# Patient Record
Sex: MALE | Race: BLACK OR AFRICAN AMERICAN | Employment: FULL TIME | URBAN - METROPOLITAN AREA
[De-identification: names, ages, dates, MRNs, and addresses within clinical notes are randomized per-mention and may not be internally consistent; named-entity substitution may affect disease eponyms.]

---

## 2021-03-13 ENCOUNTER — HOSPITAL ENCOUNTER (EMERGENCY)
Age: 56
Discharge: HOME | End: 2021-03-13
Attending: EMERGENCY MEDICINE
Payer: COMMERCIAL

## 2021-03-13 ENCOUNTER — APPOINTMENT (OUTPATIENT)
Dept: CT IMAGING | Age: 56
End: 2021-03-13
Payer: COMMERCIAL

## 2021-03-13 ENCOUNTER — APPOINTMENT (OUTPATIENT)
Dept: GENERAL RADIOLOGY | Age: 56
End: 2021-03-13
Payer: COMMERCIAL

## 2021-03-13 VITALS
WEIGHT: 210 LBS | BODY MASS INDEX: 28.44 KG/M2 | DIASTOLIC BLOOD PRESSURE: 95 MMHG | OXYGEN SATURATION: 100 % | TEMPERATURE: 98.5 F | HEIGHT: 72 IN | HEART RATE: 105 BPM | RESPIRATION RATE: 16 BRPM | SYSTOLIC BLOOD PRESSURE: 172 MMHG

## 2021-03-13 DIAGNOSIS — R79.89 ELEVATED SERUM CREATININE: ICD-10-CM

## 2021-03-13 DIAGNOSIS — I10 ESSENTIAL HYPERTENSION: Primary | ICD-10-CM

## 2021-03-13 DIAGNOSIS — R09.81 NASAL CONGESTION: ICD-10-CM

## 2021-03-13 DIAGNOSIS — E04.9 THYROID GOITER: ICD-10-CM

## 2021-03-13 DIAGNOSIS — R00.0 TACHYCARDIA: ICD-10-CM

## 2021-03-13 LAB
ALBUMIN SERPL-MCNC: 4.6 G/DL (ref 3.5–5.2)
ALP BLD-CCNC: 135 U/L (ref 40–129)
ALT SERPL-CCNC: 36 U/L (ref 0–40)
ANION GAP SERPL CALCULATED.3IONS-SCNC: 11 MMOL/L (ref 7–16)
AST SERPL-CCNC: 35 U/L (ref 0–39)
BASOPHILS ABSOLUTE: 0.07 E9/L (ref 0–0.2)
BASOPHILS RELATIVE PERCENT: 0.6 % (ref 0–2)
BILIRUB SERPL-MCNC: 0.4 MG/DL (ref 0–1.2)
BUN BLDV-MCNC: 14 MG/DL (ref 6–20)
CALCIUM SERPL-MCNC: 9.6 MG/DL (ref 8.6–10.2)
CHLORIDE BLD-SCNC: 96 MMOL/L (ref 98–107)
CO2: 26 MMOL/L (ref 22–29)
CREAT SERPL-MCNC: 1.5 MG/DL (ref 0.7–1.2)
D DIMER: 614 NG/ML DDU
EKG ATRIAL RATE: 130 BPM
EKG P AXIS: 71 DEGREES
EKG P-R INTERVAL: 144 MS
EKG Q-T INTERVAL: 294 MS
EKG QRS DURATION: 82 MS
EKG QTC CALCULATION (BAZETT): 432 MS
EKG R AXIS: 68 DEGREES
EKG T AXIS: 12 DEGREES
EKG VENTRICULAR RATE: 130 BPM
EOSINOPHILS ABSOLUTE: 0.4 E9/L (ref 0.05–0.5)
EOSINOPHILS RELATIVE PERCENT: 3.6 % (ref 0–6)
GFR AFRICAN AMERICAN: 59
GFR NON-AFRICAN AMERICAN: 59 ML/MIN/1.73
GLUCOSE BLD-MCNC: 98 MG/DL (ref 74–99)
HCT VFR BLD CALC: 46.5 % (ref 37–54)
HEMOGLOBIN: 15.6 G/DL (ref 12.5–16.5)
IMMATURE GRANULOCYTES #: 0.04 E9/L
IMMATURE GRANULOCYTES %: 0.4 % (ref 0–5)
LYMPHOCYTES ABSOLUTE: 1.05 E9/L (ref 1.5–4)
LYMPHOCYTES RELATIVE PERCENT: 9.4 % (ref 20–42)
MCH RBC QN AUTO: 29 PG (ref 26–35)
MCHC RBC AUTO-ENTMCNC: 33.5 % (ref 32–34.5)
MCV RBC AUTO: 86.4 FL (ref 80–99.9)
MONOCYTES ABSOLUTE: 0.59 E9/L (ref 0.1–0.95)
MONOCYTES RELATIVE PERCENT: 5.3 % (ref 2–12)
NEUTROPHILS ABSOLUTE: 9.07 E9/L (ref 1.8–7.3)
NEUTROPHILS RELATIVE PERCENT: 80.7 % (ref 43–80)
PDW BLD-RTO: 14.5 FL (ref 11.5–15)
PLATELET # BLD: 386 E9/L (ref 130–450)
PMV BLD AUTO: 9.3 FL (ref 7–12)
POTASSIUM REFLEX MAGNESIUM: 3.9 MMOL/L (ref 3.5–5)
RBC # BLD: 5.38 E12/L (ref 3.8–5.8)
SODIUM BLD-SCNC: 133 MMOL/L (ref 132–146)
TOTAL PROTEIN: 7.9 G/DL (ref 6.4–8.3)
TROPONIN: 0.01 NG/ML (ref 0–0.03)
TROPONIN: <0.01 NG/ML (ref 0–0.03)
TSH SERPL DL<=0.05 MIU/L-ACNC: 0.68 UIU/ML (ref 0.27–4.2)
WBC # BLD: 11.2 E9/L (ref 4.5–11.5)

## 2021-03-13 PROCEDURE — 85378 FIBRIN DEGRADE SEMIQUANT: CPT

## 2021-03-13 PROCEDURE — 84443 ASSAY THYROID STIM HORMONE: CPT

## 2021-03-13 PROCEDURE — 71275 CT ANGIOGRAPHY CHEST: CPT

## 2021-03-13 PROCEDURE — 84484 ASSAY OF TROPONIN QUANT: CPT

## 2021-03-13 PROCEDURE — 2580000003 HC RX 258: Performed by: EMERGENCY MEDICINE

## 2021-03-13 PROCEDURE — 99284 EMERGENCY DEPT VISIT MOD MDM: CPT

## 2021-03-13 PROCEDURE — 93005 ELECTROCARDIOGRAM TRACING: CPT | Performed by: EMERGENCY MEDICINE

## 2021-03-13 PROCEDURE — 71045 X-RAY EXAM CHEST 1 VIEW: CPT

## 2021-03-13 PROCEDURE — 93010 ELECTROCARDIOGRAM REPORT: CPT | Performed by: INTERNAL MEDICINE

## 2021-03-13 PROCEDURE — 6360000004 HC RX CONTRAST MEDICATION: Performed by: RADIOLOGY

## 2021-03-13 PROCEDURE — U0003 INFECTIOUS AGENT DETECTION BY NUCLEIC ACID (DNA OR RNA); SEVERE ACUTE RESPIRATORY SYNDROME CORONAVIRUS 2 (SARS-COV-2) (CORONAVIRUS DISEASE [COVID-19]), AMPLIFIED PROBE TECHNIQUE, MAKING USE OF HIGH THROUGHPUT TECHNOLOGIES AS DESCRIBED BY CMS-2020-01-R: HCPCS

## 2021-03-13 PROCEDURE — 80053 COMPREHEN METABOLIC PANEL: CPT

## 2021-03-13 PROCEDURE — 85025 COMPLETE CBC W/AUTO DIFF WBC: CPT

## 2021-03-13 PROCEDURE — 36415 COLL VENOUS BLD VENIPUNCTURE: CPT

## 2021-03-13 RX ORDER — 0.9 % SODIUM CHLORIDE 0.9 %
1000 INTRAVENOUS SOLUTION INTRAVENOUS ONCE
Status: COMPLETED | OUTPATIENT
Start: 2021-03-13 | End: 2021-03-13

## 2021-03-13 RX ADMIN — IOPAMIDOL 80 ML: 755 INJECTION, SOLUTION INTRAVENOUS at 21:12

## 2021-03-13 RX ADMIN — SODIUM CHLORIDE 1000 ML: 9 INJECTION, SOLUTION INTRAVENOUS at 19:20

## 2021-03-13 RX ADMIN — SODIUM CHLORIDE 1000 ML: 9 INJECTION, SOLUTION INTRAVENOUS at 21:45

## 2021-03-13 NOTE — ED PROVIDER NOTES
HPI:  3/13/21, Time: 5:16 PM JANIA Lunsford is a 50 y.o. male presenting to the ED for nasal congestion and rhinorrhea beginning approximately 2 weeks ago. Symptoms have been mild to moderate in severity and persistent since onset. He reports associated symptoms of a nonproductive cough. No exacerbating or alleviating factors. States he has been taking pseudoephedrine and an inhaler without relief. Last used pseudoephedrine inhaler approximately 30 minutes prior to arrival.  Patient noted to be hypertensive and tachycardic on arrival.  He has a history of hypertension, and he states he has been compliant with his medications. Denies documented fevers, loss of sense of taste or smell, chest pain, shortness of breath, hemoptysis, syncope, abdominal pain, nausea, vomiting, diarrhea, or headache. No focal deficits. No known exposures to COVID-19 or prior history of COVID-19. Review of Systems:   Pertinent positives and negatives are stated within HPI, all other systems reviewed and are negative.          --------------------------------------------- PAST HISTORY ---------------------------------------------  Past Medical History:  has a past medical history of Hypertension. Past Surgical History:  has no past surgical history on file. Social History:  reports that he has never smoked. He does not have any smokeless tobacco history on file. He reports previous alcohol use. He reports previous drug use. Family History: family history is not on file. The patients home medications have been reviewed.     Allergies: Patient has no allergy information on record.    -------------------------------------------------- RESULTS -------------------------------------------------  All laboratory and radiology results have been personally reviewed by myself   LABS:  Results for orders placed or performed during the hospital encounter of 03/13/21   COVID-19   Result Value Ref Range    SARS-CoV-2, PCR Not Detected Not Detected   CBC Auto Differential   Result Value Ref Range    WBC 11.2 4.5 - 11.5 E9/L    RBC 5.38 3.80 - 5.80 E12/L    Hemoglobin 15.6 12.5 - 16.5 g/dL    Hematocrit 46.5 37.0 - 54.0 %    MCV 86.4 80.0 - 99.9 fL    MCH 29.0 26.0 - 35.0 pg    MCHC 33.5 32.0 - 34.5 %    RDW 14.5 11.5 - 15.0 fL    Platelets 113 529 - 688 E9/L    MPV 9.3 7.0 - 12.0 fL    Neutrophils % 80.7 (H) 43.0 - 80.0 %    Immature Granulocytes % 0.4 0.0 - 5.0 %    Lymphocytes % 9.4 (L) 20.0 - 42.0 %    Monocytes % 5.3 2.0 - 12.0 %    Eosinophils % 3.6 0.0 - 6.0 %    Basophils % 0.6 0.0 - 2.0 %    Neutrophils Absolute 9.07 (H) 1.80 - 7.30 E9/L    Immature Granulocytes # 0.04 E9/L    Lymphocytes Absolute 1.05 (L) 1.50 - 4.00 E9/L    Monocytes Absolute 0.59 0.10 - 0.95 E9/L    Eosinophils Absolute 0.40 0.05 - 0.50 E9/L    Basophils Absolute 0.07 0.00 - 0.20 E9/L   Comprehensive Metabolic Panel w/ Reflex to MG   Result Value Ref Range    Sodium 133 132 - 146 mmol/L    Potassium reflex Magnesium 3.9 3.5 - 5.0 mmol/L    Chloride 96 (L) 98 - 107 mmol/L    CO2 26 22 - 29 mmol/L    Anion Gap 11 7 - 16 mmol/L    Glucose 98 74 - 99 mg/dL    BUN 14 6 - 20 mg/dL    CREATININE 1.5 (H) 0.7 - 1.2 mg/dL    GFR Non-African American 59 >=60 mL/min/1.73    GFR African American 59     Calcium 9.6 8.6 - 10.2 mg/dL    Total Protein 7.9 6.4 - 8.3 g/dL    Albumin 4.6 3.5 - 5.2 g/dL    Total Bilirubin 0.4 0.0 - 1.2 mg/dL    Alkaline Phosphatase 135 (H) 40 - 129 U/L    ALT 36 0 - 40 U/L    AST 35 0 - 39 U/L   Troponin   Result Value Ref Range    Troponin 0.01 0.00 - 0.03 ng/mL   D-Dimer, Quantitative   Result Value Ref Range    D-Dimer, Quant 614 ng/mL DDU   Troponin   Result Value Ref Range    Troponin <0.01 0.00 - 0.03 ng/mL   TSH without Reflex   Result Value Ref Range    TSH 0.679 0.270 - 4.200 uIU/mL   EKG 12 Lead   Result Value Ref Range    Ventricular Rate 130 BPM    Atrial Rate 130 BPM    P-R Interval 144 ms    QRS Duration 82 ms    Q-T Interval 294 ms    QTc Calculation (Bazett) 432 ms    P Axis 71 degrees    R Axis 68 degrees    T Axis 12 degrees       RADIOLOGY:  Interpreted by Vasiliy Lopez   Final Result   1. No pulmonary embolism. 2.  No pneumonia or pleural effusion. 3.  Multiple prominent and mildly enlarged mediastinal lymph nodes. 4.  Enlarged left lobe of the thyroid possibly related to thyroid goiter. Ultrasound may be helpful for further evaluation. XR CHEST PORTABLE   Final Result   No acute process. ------------------------- NURSING NOTES AND VITALS REVIEWED ---------------------------   The nursing notes within the ED encounter and vital signs as below have been reviewed. BP (!) 172/95   Pulse 105   Temp 98.5 °F (36.9 °C) (Oral)   Resp 16   Ht 6' (1.829 m)   Wt 210 lb (95.3 kg)   SpO2 100%   BMI 28.48 kg/m²   Oxygen Saturation Interpretation: Normal      ---------------------------------------------------PHYSICAL EXAM--------------------------------------      Constitutional/General: Alert and oriented x3, well appearing, non toxic in NAD  Head: Normocephalic and atraumatic  Eyes: PERRL, EOMI  Mouth: Oropharynx clear, handling secretions, no trismus. No pharyngeal swelling or erythema. Uvula midline. No tonsillar enlargement or exudate. No evidence of peritonsillar abscess. No tongue swelling or lip swelling. No soft palate elevation or evidence of iggy's. No stridor or trismus. No nuchal rigidity. Neck: Supple, full ROM, no nuchal rigidity, no meningeal signs  Pulmonary: Lungs wheezing to right upper lobe. Not in respiratory distress  Cardiovascular:  Regular rhythm, tachycardia no murmurs, gallops, or rubs. 2+ distal pulses  Abdomen: Soft, non tender, non distended,   Extremities: Moves all extremities x 4. Warm and well perfused. No leg edema. No calf tenderness.   Skin: warm and dry without rash  Neurologic: GCS 15, no focal motor or sensory deficits   Psych: Normal Affect      ------------------------------ ED COURSE/MEDICAL DECISION MAKING----------------------  Medications   0.9 % sodium chloride bolus (0 mLs Intravenous Stopped 3/13/21 2025)   iopamidol (ISOVUE-370) 76 % injection 80 mL (80 mLs Intravenous Given 3/13/21 2112)   0.9 % sodium chloride bolus (0 mLs Intravenous Stopped 3/13/21 2259)       Medical Decision Making/ED COURSE:   Patient is a 61-year-old male presenting with nasal congestion, rhinorrhea, and nonproductive cough. Patient also noted to be hypertensive and tachycardic. Admits to taking Sudafed and an inhaler just prior to arrival which may be contributing to his abnormal vital signs. In the ED, patient was tachycardic and hypertensive. He was well appearing on exam.  Patient persistently tachycardic, so labs were obtained. Reviewed and interpreted labs. Labs were significant for elevation in creatinine at 1.5 though there was no prior for comparison. Labs also showed an elevation in his D-dimer at 614. Labs are otherwise within normal limits. CTA chest obtained due to persistent tachycardia and elevated D-dimer. No evidence of PE. He did have incidental findings including a thyroid goiter which were discussed with the patient. He will need further outpatient work-up with thyroid ultrasound. He has a PCP he can follow-up with. Plan for delta troponin and reevaluation. Patient remained hemodynamically stable throughout ED course. ED Course as of Mar 14 2215   Sat Mar 13, 2021   1738 EKG: This EKG is signed and interpreted by me. Rate: 130  Rhythm: Sinus  Interpretation: Sinus tachycardia, normal TN interval, normal QRS, normal QT interval, no acute ST or T wave changes  Comparison: no previous EKG available        [JA]   1905 Patient with initial improvement of blood pressure and heart rate but later became tachycardic and hypertensive once again. Labs will be obtained.     [JA]   2018 Patient is persistently hypertensive and tachycardic. Patient is nontoxic on reevaluation with no chest pain or shortness of breath. Discussed plan to obtain D-dimer given persistent tachycardia as well as delta troponin. Patient is agreeable. [JA]   2134 Discussed findings including CTA chest.  Discussed incidental findings of thyroid goiter. Will need further outpatient work-up. Also discussed enlarged mediastinal lymph nodes. Patient has a PCP he can follow-up with for further evaluation and monitoring. [JA]   2135 Patient signed out to Dr. Ken Bowen pending delta troponin, TSH, and re-evaluation of vitals after IVF. [JA]   2253 Received patient from Dr. Edge Bodily work-up is found to be reassuring with negative delta troponin and thyroid studies reassuring. Reviewed CTA of the chest which showed no abnormal findings patient is aware of his thyroid goiter. Patient's heart rate still 120 on reevaluation he is about alf through a liter of fluid will reassess after IV fluids. Blood pressure 173/100 which is an improvement from his initial blood pressure on arrival.  I did do for a physical exam ears appear clear patient is alert and oriented with no neck stiffness throat is clear no lymphadenopathy noted. Patient denies rashes lungs are clear abdomen is soft and nontender his legs are without swelling or sign of wounds. [CF]   4798 On reevaluation patient heart rate 10 7-110 he is continue to feel improvement. Patient is comfortable plan of discharge and was discharged with outpatient follow-up. [CF]      ED Course User Index  [CF] Ade Lawson DO  [JA] Benita Ortiz MD       Counseling: The emergency provider has spoken with the patient and discussed todays results, in addition to providing specific details for the plan of care and counseling regarding the diagnosis and prognosis.   Questions are answered at this time and they are agreeable with the plan.      --------------------------------- IMPRESSION AND DISPOSITION ---------------------------------    IMPRESSION  1. Essential hypertension    2. Tachycardia    3. Thyroid goiter    4. Nasal congestion    5. Elevated serum creatinine        DISPOSITION  Disposition: Discharge to home  Patient condition is stable      NOTE: This report was transcribed using voice recognition software.  Every effort was made to ensure accuracy; however, inadvertent computerized transcription errors may be present    I, Peterson Fernandes MD, am the primary provider of this record       Peterson Fernandes MD  03/14/21 7001

## 2021-03-14 LAB — SARS-COV-2, PCR: NOT DETECTED
